# Patient Record
Sex: FEMALE | URBAN - METROPOLITAN AREA
[De-identification: names, ages, dates, MRNs, and addresses within clinical notes are randomized per-mention and may not be internally consistent; named-entity substitution may affect disease eponyms.]

---

## 2021-04-27 ENCOUNTER — HOSPITAL ENCOUNTER (EMERGENCY)
Facility: CLINIC | Age: 46
Discharge: HOME | End: 2021-04-27

## 2021-04-27 VITALS — TEMPERATURE: 99.5 F

## 2021-04-27 NOTE — ED NOTES
After having her temp checked by the writer and finding it to be 99.5, pt requested to go home.  Pt signed AMA form, left before being triaged.

## 2022-03-21 ENCOUNTER — OFFICE VISIT (OUTPATIENT)
Dept: PLASTIC SURGERY | Facility: CLINIC | Age: 47
End: 2022-03-21

## 2022-03-21 DIAGNOSIS — Z41.1 ELECTIVE PROCEDURE FOR UNACCEPTABLE COSMETIC APPEARANCE: Primary | ICD-10-CM

## 2022-03-21 NOTE — LETTER
March 21, 2022  Re: Reba Marshall  1975    Dear Dr. Lowery,    Thank you so much for referring Reba Marshall to the [facility]. I had the pleasure of visiting with Reba today.     Attached you will find a copy of my note. Please feel free to reach out to me with any questions, [phone].     Facial Plastic and Reconstructive Surgery    HPI:   I had the pleasure of seeing Reba Marshall today in clinic for consultation for    Reba Marsahll is a 46 year old         Review Of Systems  ROS: 10 point ROS neg other than the symptoms noted above in the HPI.    There is no problem list on file for this patient.    No past surgical history on file.  No current outpatient medications on file.     Patient has no allergy information on record.  Social History     Socioeconomic History     Marital status: Single     Spouse name: Not on file     Number of children: Not on file     Years of education: Not on file     Highest education level: Not on file   Occupational History     Not on file   Tobacco Use     Smoking status: Not on file     Smokeless tobacco: Not on file   Substance and Sexual Activity     Alcohol use: Not on file     Drug use: Not on file     Sexual activity: Not on file   Other Topics Concern     Not on file   Social History Narrative     Not on file     Social Determinants of Health     Financial Resource Strain: Not on file   Food Insecurity: Not on file   Transportation Needs: Not on file   Physical Activity: Not on file   Stress: Not on file   Social Connections: Not on file   Intimate Partner Violence: Not on file   Housing Stability: Not on file     No family history on file.    PE:  Alert and Oriented, Answering Questions Appropriately  Atraumatic, Normocephalic, Face Symmetric  Skin: Huber 2  Facial Nerve Intact and facial movement symmetric  EOM's, PEERL  Profile view with deficient chin  Mentalis hyperfunction to close mouth with mentalis dimpling skin  Occlusion in tact  Midface volume  loss with tear trough formation  Jowls bilaterally  Blunting of the cervico mandibular angle         IMPRESSION:    Facial aging and encounter for cosmetic procedures      PLAN:    Discussed face and neck lift  -discussed risks of hematoma, facial nerve injury, scarring  -discussed post operative time course, she would need to bounce back quickly- working and has three children    Discussed chin deficiency  -morphed chin augmentation  -discussed surgery and risks    Discussed filler to midface and tear troughs  Wants filler today      Procedure: Chemodenervation with Botulinum Toxin A  Indication: Undesirable Dynamic Rhytids  Injector: Sarah Long MD      Informed consent was obtained.  The skin was cleaned with antimicrobial solution and a topical ice was placed.     The patient was asked to systematically engage the muscles in the area to be injected. The tuberculin needles were used for subdermal injection and hemostasis was obtained with light digital pressure when needed. The skin was cleaned.     A total of 23 units were injected.  Botulinum toxin A type: Botox    Please see procedure log.    The patient tolerated the procedure well and there were no complications. Post procedure care instructions were given to the patient.    Photodocumentation was obtained.             Your trust in our practice and care is much appreciated.    Sincerely,  Sarah Long MD

## 2022-03-21 NOTE — LETTER
3/21/2022       RE: Reba Marshall  [address]  [place] [address]     Dear Colleague,    Thank you for referring your patient, Reba Marshall, to the [facility]. Please see a copy of my visit note below.    Facial Plastic and Reconstructive Surgery    HPI:   I had the pleasure of seeing Reba Marshall today in clinic for consultation for    Reba Marshall is a 46 year old         Review Of Systems  ROS: 10 point ROS neg other than the symptoms noted above in the HPI.    There is no problem list on file for this patient.    No past surgical history on file.  No current outpatient medications on file.     Patient has no allergy information on record.  Social History     Socioeconomic History     Marital status: Single     Spouse name: Not on file     Number of children: Not on file     Years of education: Not on file     Highest education level: Not on file   Occupational History     Not on file   Tobacco Use     Smoking status: Not on file     Smokeless tobacco: Not on file   Substance and Sexual Activity     Alcohol use: Not on file     Drug use: Not on file     Sexual activity: Not on file   Other Topics Concern     Not on file   Social History Narrative     Not on file     Social Determinants of Health     Financial Resource Strain: Not on file   Food Insecurity: Not on file   Transportation Needs: Not on file   Physical Activity: Not on file   Stress: Not on file   Social Connections: Not on file   Intimate Partner Violence: Not on file   Housing Stability: Not on file     No family history on file.    PE:  Alert and Oriented, Answering Questions Appropriately  Atraumatic, Normocephalic, Face Symmetric  Skin: Huber 2  Facial Nerve Intact and facial movement symmetric  EOM's, PEERL  Profile view with deficient chin  Mentalis hyperfunction to close mouth with mentalis dimpling skin  Occlusion in tact  Midface volume loss with tear trough  formation  Jowls bilaterally  Blunting of the cervico mandibular angle         IMPRESSION:    Facial aging and encounter for cosmetic procedures      PLAN:    Discussed face and neck lift  -discussed risks of hematoma, facial nerve injury, scarring  -discussed post operative time course, she would need to bounce back quickly- working and has three children    Discussed chin deficiency  -morphed chin augmentation  -discussed surgery and risks    Discussed filler to midface and tear troughs  Wants filler today      Procedure: Chemodenervation with Botulinum Toxin A  Indication: Undesirable Dynamic Rhytids  Injector: Sarah Long MD      Informed consent was obtained.  The skin was cleaned with antimicrobial solution and a topical ice was placed.     The patient was asked to systematically engage the muscles in the area to be injected. The tuberculin needles were used for subdermal injection and hemostasis was obtained with light digital pressure when needed. The skin was cleaned.     A total of 23 units were injected.  Botulinum toxin A type: Botox    Please see procedure log.    The patient tolerated the procedure well and there were no complications. Post procedure care instructions were given to the patient.    Photodocumentation was obtained.           Again, thank you for allowing me to participate in the care of your patient.      Sincerely,    Sarah Long MD

## 2022-03-21 NOTE — LETTER
Date:March 21, 2022      Patient was self referred, no letter generated. Do not send.        [facility] Health Information

## 2022-03-21 NOTE — PROGRESS NOTES
Facial Plastic and Reconstructive Surgery    HPI:   I had the pleasure of seeing Reba Marshall today in clinic for consultation for    Reba Marshall is a 46 year old         Review Of Systems  ROS: 10 point ROS neg other than the symptoms noted above in the HPI.    There is no problem list on file for this patient.    No past surgical history on file.  No current outpatient medications on file.     Patient has no allergy information on record.  Social History     Socioeconomic History     Marital status: Single     Spouse name: Not on file     Number of children: Not on file     Years of education: Not on file     Highest education level: Not on file   Occupational History     Not on file   Tobacco Use     Smoking status: Not on file     Smokeless tobacco: Not on file   Substance and Sexual Activity     Alcohol use: Not on file     Drug use: Not on file     Sexual activity: Not on file   Other Topics Concern     Not on file   Social History Narrative     Not on file     Social Determinants of Health     Financial Resource Strain: Not on file   Food Insecurity: Not on file   Transportation Needs: Not on file   Physical Activity: Not on file   Stress: Not on file   Social Connections: Not on file   Intimate Partner Violence: Not on file   Housing Stability: Not on file     No family history on file.    PE:  Alert and Oriented, Answering Questions Appropriately  Atraumatic, Normocephalic, Face Symmetric  Skin: Huber 2  Facial Nerve Intact and facial movement symmetric  EOM's, PEERL  Profile view with deficient chin  Mentalis hyperfunction to close mouth with mentalis dimpling skin  Occlusion in tact  Midface volume loss with tear trough formation  Jowls bilaterally  Blunting of the cervico mandibular angle         IMPRESSION:    Facial aging and encounter for cosmetic procedures      PLAN:    Discussed face and neck lift  -discussed risks of hematoma, facial nerve injury, scarring  -discussed post operative time  course, she would need to bounce back quickly- working and has three children    Discussed chin deficiency  -morphed chin augmentation  -discussed surgery and risks    Discussed filler to midface and tear troughs  Wants filler today      Procedure: Chemodenervation with Botulinum Toxin A  Indication: Undesirable Dynamic Rhytids  Injector: Sarah Long MD      Informed consent was obtained.  The skin was cleaned with antimicrobial solution and a topical ice was placed.     The patient was asked to systematically engage the muscles in the area to be injected. The tuberculin needles were used for subdermal injection and hemostasis was obtained with light digital pressure when needed. The skin was cleaned.     A total of 23 units were injected.  Botulinum toxin A type: Botox    Please see procedure log.    The patient tolerated the procedure well and there were no complications. Post procedure care instructions were given to the patient.    Photodocumentation was obtained.

## 2022-03-21 NOTE — LETTER
Date:March 21, 2022      Patient was self referred, no letter generated. Do not send.        [facility] Health Information

## 2022-03-22 NOTE — NURSING NOTE
2D/3D photodocumentation obtained.    Completed education re: Facelift and gave pt packet of information to take home.    Gave pt a cosmetic quote for Facelift (see Media tab).    Discussed quote in great detail with pt, including the fact that the Anesthesia and Facility fees are an estimate based on time and may be subject to change.    Pt will call when/if ready to schedule.    Karla Freedman RN  3/22/2022 1:58 PM